# Patient Record
Sex: FEMALE | URBAN - METROPOLITAN AREA
[De-identification: names, ages, dates, MRNs, and addresses within clinical notes are randomized per-mention and may not be internally consistent; named-entity substitution may affect disease eponyms.]

---

## 2023-01-21 ENCOUNTER — APPOINTMENT (RX ONLY)
Dept: URBAN - METROPOLITAN AREA CLINIC 259 | Facility: CLINIC | Age: 48
Setting detail: DERMATOLOGY
End: 2023-01-21

## 2023-01-21 DIAGNOSIS — Z41.9 ENCOUNTER FOR PROCEDURE FOR PURPOSES OTHER THAN REMEDYING HEALTH STATE, UNSPECIFIED: ICD-10-CM

## 2023-01-21 PROCEDURE — ? VENUS VIVA

## 2023-01-21 PROCEDURE — ? ADDITIONAL NOTES

## 2023-01-21 ASSESSMENT — LOCATION DETAILED DESCRIPTION DERM
LOCATION DETAILED: RIGHT INFERIOR CENTRAL MALAR CHEEK
LOCATION DETAILED: RIGHT CHIN
LOCATION DETAILED: LEFT INFERIOR CENTRAL MALAR CHEEK
LOCATION DETAILED: LEFT MEDIAL FOREHEAD

## 2023-01-21 ASSESSMENT — LOCATION SIMPLE DESCRIPTION DERM
LOCATION SIMPLE: LEFT CHEEK
LOCATION SIMPLE: RIGHT CHEEK
LOCATION SIMPLE: CHIN
LOCATION SIMPLE: LEFT FOREHEAD

## 2023-01-21 ASSESSMENT — LOCATION ZONE DERM: LOCATION ZONE: FACE

## 2023-01-21 NOTE — PROCEDURE: ADDITIONAL NOTES
Detail Level: Simple
Render Risk Assessment In Note?: no
Additional Notes: Patient presents for training

## 2023-01-21 NOTE — HPI: COSMETIC (LASER RESURFACING)
Have You Had Laser Resurfacing Before?: has not had previous treatments
When Outside In The Sun, Do You...: very rarely burns, tans very easily
Additional History: Patient presents for training

## 2023-01-21 NOTE — PROCEDURE: VENUS VIVA
Treatment Number: 1
Starting Radiofrequency %: 35
Final Radiofrequency %: 45
Post-Procedure Text: Vaseline and ice applied. Post care reviewed with patient.
Applicator: [email or web address]
Post-Procedures Photographs: Yes
Milliseconds: 1.0
Location: Full Face
Treatment Time: 20
Immediate Post-Procedure Findings: no edema or erythema
Consent: Written consent obtained, risks reviewed including but not limited to crusting, scabbing, blistering, scarring, darker or lighter pigmentary change, and/or incomplete removal.
Topical Anesthesia?: 5% lidocaine
Applicator: Viva
Detail Level: Zone
Post-Care Instructions: I reviewed with the patient in detail post-care instructions. Advised pt to not apply anything to their skin for the next 24 hours. Patient should stay away from the sun and wear sun protection until treated areas are fully healed.
Volts: 220